# Patient Record
Sex: FEMALE | Race: AMERICAN INDIAN OR ALASKA NATIVE
[De-identification: names, ages, dates, MRNs, and addresses within clinical notes are randomized per-mention and may not be internally consistent; named-entity substitution may affect disease eponyms.]

---

## 2019-07-18 ENCOUNTER — HOSPITAL ENCOUNTER (EMERGENCY)
Dept: HOSPITAL 5 - ED | Age: 44
Discharge: HOME | End: 2019-07-18
Payer: COMMERCIAL

## 2019-07-18 VITALS — SYSTOLIC BLOOD PRESSURE: 129 MMHG | DIASTOLIC BLOOD PRESSURE: 83 MMHG

## 2019-07-18 DIAGNOSIS — M19.90: ICD-10-CM
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
## 2019-07-18 DIAGNOSIS — Z98.890: ICD-10-CM
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
## 2019-07-18 DIAGNOSIS — Z79.899: ICD-10-CM
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
## 2019-07-18 DIAGNOSIS — M79.645: Primary | ICD-10-CM

## 2019-07-18 PROCEDURE — 99283 EMERGENCY DEPT VISIT LOW MDM: CPT

## 2019-07-18 NOTE — XRAY REPORT
Left hand 2 views



Indication:

left middle finger pain



Findings:

There is no fracture, subluxation, or other acute radiographic abnormality of the left hand. 



Signer Name: Sincere Dennis MD 

Signed: 7/18/2019 3:22 PM

 Workstation Name: [id number]

## 2019-07-18 NOTE — EMERGENCY DEPARTMENT REPORT
Upper Extremity





- HPI


Chief Complaint: Extremity Injury, Upper


Stated Complaint: FINGER INJURY


Time Seen by Provider: 07/18/19 14:58


Upper Extremity: Left Hand


Occurred When: 1 Day


Mechanism: Hit with Object


Severity: moderate


Symptoms: Yes Pain with Movement, Yes Limited Range of Movement, Yes 

Bruising/Ecchymosis, No Deformity, No Numbness, No Weakness, No Swelling, No 

Laceration or Abrasion


Other History: This is a 44-year-old female who presents to ED complaining of 

left third digit pain times yesterday.  Patient states that she accidentally hit

her hand on the headboard of her car.  Patient states she sound when it 

happened.  Patient admits some swelling to the third finger.  She admits pain 

with movement of that finger she denies any deformity





ED Review of Systems


ROS: 


Stated complaint: FINGER INJURY


Other details as noted in HPI





Comment: All other systems reviewed and negative





ED Past Medical Hx





- Past Medical History


Previous Medical History?: Yes


Hx Arthritis: Yes





- Surgical History


Past Surgical History?: Yes


Additional Surgical History: left ankle surgery





- Social History


Smoking Status: Never Smoker


Substance Use Type: None





- Medications


Home Medications: 


                                Home Medications











 Medication  Instructions  Recorded  Confirmed  Last Taken  Type


 


Ibuprofen [Motrin 800 MG tab] 800 mg PO TID #30 tablet 07/18/19  Unknown Rx














Upper Extremity Exam





- Exam


General: 


Vital signs noted. No distress. Alert and acting appropriately.








ED Course


                                   Vital Signs











  07/18/19





  14:59


 


Temperature 97.7 F


 


Pulse Rate 88


 


Respiratory 14





Rate 


 


Blood Pressure 129/83


 


O2 Sat by Pulse 100





Oximetry 














ED Medical Decision Making





- Radiology Data


Radiology results: report reviewed, image reviewed





Ordering Physician: SERENA PERKINS 


Date of Service: 07/18/19 


Procedure(s): XR hand 3+V LT 


Accession Number(s): [id number] 





cc: SERENA PERKINS 





Fluoro Time In Minutes: 





Left hand 2 views 





Indication: 


left middle finger pain 





Findings: 


There is no fracture, subluxation, or other acute radiographic abnormality of 

the left hand. 





Signer Name: Sincere Dennis MD 


Signed: 7/18/2019 3:22 PM 


Workstation Name: [id number] 








Transcribed By:  


Dictated By: Sincere Dennis MD 


Electronically Authenticated By: Sincere Dennis MD 


Signed Date/Time: 07/18/19 1522 











- Medical Decision Making


44-year-old female presents with pain of the third digit.


X-ray shows no acute fractures or dislocation.


Discussed the patient to the ice finger 3 times a day.


Discussed Motrin as needed for pain.


Vital signs are normal patient is in no acute distress.





Critical care attestation.: 


If time is entered above; I have spent that time in minutes in the direct care 

of this critically ill patient, excluding procedure time.








ED Disposition


Clinical Impression: 


 Finger pain, left





Disposition: DC-01 TO HOME OR SELFCARE


Is pt being admited?: No


Does the pt Need Aspirin: No


Condition: Stable


Instructions:  Finger Sprain (ED), Arthralgia (ED)


Additional Instructions: 


Make sure to follow up with the primary care physician as discussed.


Take all your medications as you've been prescribed.


If you have any worsening symptoms or develop new symptoms please return to ED 

immediately.


Prescriptions: 


Ibuprofen [Motrin 800 MG tab] 800 mg PO TID #30 tablet


Referrals: 


[facility] [place] [Primary Care Provider] - 3-5 Days


[facility] [Outside] - 3-5 Days


[facility] [Outside] - 3-5 Days


Forms:  Accompanied Note, Work/School Release Form(ED)


Time of Disposition: 17:52

## 2019-07-18 NOTE — EVENT NOTE
ED Screening Note


ED Screening Note: 








pt presents with left middle finger pain that began yesterday 


states she was dusting and jammed it against the head board


has pain and edema


pt is right hand dominant


never injured before





no pmhx 


no allergies to meds





This initial assessment/diagnostic orders/clinical plan/treatment(s) is/are 

subject to change based on patients health status, clinical progression and re-

assessment by fellow clinical providers in the ED. Further treatment and workup 

at subsequent clinical providers discretion. Patient/guardian urged not to elope

from the ED as their condition may be serious if not clinically assessed and 

managed. 





Initial orders include: XR of the left hand